# Patient Record
Sex: FEMALE | Race: WHITE | NOT HISPANIC OR LATINO | Employment: FULL TIME | URBAN - METROPOLITAN AREA
[De-identification: names, ages, dates, MRNs, and addresses within clinical notes are randomized per-mention and may not be internally consistent; named-entity substitution may affect disease eponyms.]

---

## 2023-02-17 ENCOUNTER — MEDICAL CORRESPONDENCE (OUTPATIENT)
Dept: HEALTH INFORMATION MANAGEMENT | Facility: CLINIC | Age: 28
End: 2023-02-17

## 2023-02-20 ENCOUNTER — TELEPHONE (OUTPATIENT)
Dept: GASTROENTEROLOGY | Facility: CLINIC | Age: 28
End: 2023-02-20
Payer: COMMERCIAL

## 2023-02-20 ENCOUNTER — PATIENT OUTREACH (OUTPATIENT)
Dept: GASTROENTEROLOGY | Facility: CLINIC | Age: 28
End: 2023-02-20
Payer: COMMERCIAL

## 2023-02-20 ENCOUNTER — TRANSCRIBE ORDERS (OUTPATIENT)
Dept: OTHER | Age: 28
End: 2023-02-20

## 2023-02-20 ENCOUNTER — DOCUMENTATION ONLY (OUTPATIENT)
Dept: GASTROENTEROLOGY | Facility: CLINIC | Age: 28
End: 2023-02-20
Payer: COMMERCIAL

## 2023-02-20 DIAGNOSIS — K83.01 PSC (PRIMARY SCLEROSING CHOLANGITIS) (H): ICD-10-CM

## 2023-02-20 DIAGNOSIS — K83.1 BILIARY OBSTRUCTION (H): ICD-10-CM

## 2023-02-20 DIAGNOSIS — K83.1 BILIARY STRICTURE (H): Primary | ICD-10-CM

## 2023-02-20 NOTE — TELEPHONE ENCOUNTER
"Advanced Endoscopy     Referring provider:  Dr Fabrizio Worthington [facility]      Referred to: [facility]     Provider Requested: NA     Referral Received: 02/20/23     Records received: CareEverywhere     Images received: requested push    Evaluation for:   PSC with dominant biliary stricture with elevated LFTs/pruritus, CBD cannuled, but unable to traverse stricture for dilation & brushings. Please evaluate for additional options       Clinical History (per RN review):       ERCP 2/14/23    Procedure:          - The ERCP duodeno was introduced through the mouth and advanced to the             duodenum and used to locate the major papilla.          -  The ERCP was technically difficult and complex due to challenging             cannulation because of abnormal anatomy.          -  The patient tolerated the procedure well.         ERCP with intraoperative interpretation of cholangiogram.   Findings:          -  The  film was normal.          -  The bile duct was deeply cannulated with the Dreamtome sphincterotome.             Contrast was injected.  The common hepatic duct contained a single severe             stenosis.         ERCP: A side-viewing duodenoscope was advanced to the ampulla without bile             flow. The CBD was selectively cannulated with a sphincterotome preloaded with             an 0.035\" guidewire with guidewire alone on first attempt with sphincterotome             subsequently advanced over the guidewire into the CBD, but an 0.035\" guidewire             could not be advanced into CHD/intrahepatic ducts despite change to 0.025\"             guidewire. A cholangiogram showed 0.5 cm CBD with CHD biliary stricture without             leaks and mild filling of PD during CBD injection with contrast. Despite             multiple additional maneuvers the guidewires could not be advanced beyond CHD             stricture. The duodenoscope was then completely withdrawn from the " "patient and             the procedure terminated. The patient tolerated the procedure well without any             immediate complications and was then moved to the PACU recovery area.   Impression:          -  A single severe biliary stricture was found in the common hepatic duct.         ampulla without bile flow         CBD was selectively cannulated with a sphincterotome preloaded with an 0.035\"         guidewire with guidewire alone, but an 0.035\" guidewire could not be advanced         into CHD/intrahepatic ducts despite change to 0.025\" guidewire         cholangiogram showed 0.5 cm CBD with CHD biliary stricture         despite multiple additional maneuvers the guidewires could not be advanced         beyond CHD stricture     LABS 1/16/23  Alkaline Phosphatase 50 - 136 U/L 143 High      AST 15 - 37 U/L 78 High      ALT 12 - 52 U/L 77 High          MRCP 1/31/23    IMPRESSION:   1.  Irregular stricture of the distal common hepatic duct/proximal common bile duct. The irregularity measures approximately 1.3 cm in length, cholangiocarcinoma is possible. Endoscopic retrograde cholangiopancreatogram recommended.   2.  Irregular, dilated intrahepatic ducts with multifocal strictures compatible with primary sclerosing cholangitis. Mildly enlarged portal lymph nodes, similar.   3.  Dilated central canal of the spinal cord and the conus, this may be a normal variant ventriculus terminalis or a small syrinx, unchanged.      MD review date: 02/20/23    MD Decision for clinic consultation/Orders:   Dr. Fournier:  please schedule ERCP with me for biliary stricture and dilation       Referral updates/Patient contacted: 2/22/23 Scheduled for procedure.    "

## 2023-02-20 NOTE — PROGRESS NOTES
Called [facility] to request that they push images to [place] PACS.    Images Requested:  MRCP 1/31/23   Fluro 2/14/23    Clinic Information:   [facility]  MRI    [address]    [place] [address]    Phone #: [phone] sk

## 2023-02-21 ENCOUNTER — PATIENT OUTREACH (OUTPATIENT)
Dept: GASTROENTEROLOGY | Facility: CLINIC | Age: 28
End: 2023-02-21
Payer: COMMERCIAL

## 2023-02-21 NOTE — PROGRESS NOTES
Following review of referral, per Dr. Fournier: Please schedule ERCP with me for biliary stricture and dilation.    Called patient to discuss symptoms.     Procedure/Imaging/Clinic: ERCP  Physician: Irlanda  Timing: Next available  Procedure length: Provider average  Anesthesia:General  Dx: Biliary stricture and dilation  Tier:2  Location: [facility]    Comments: Offered 3/27/23. Patient is agreeable to schedule. Patient denies pain or nausea. No yellowing of skin or eyes or dark urine. Instructed to contact clinic in the event that these symptoms arise.     Explained they will need a , someone to stay with them for 24 hours and should stay in town for 24 hours (within 45 min of Hospital) post procedure. Patient is from out of town.  or Dad to accompany.     Patient will need a pre-op physical within 30 days of procedure. If outside [facility] system, will need physical faxed to number [phone]   If you do not get a preop physical, your procedure could be cancelled, patient voiced understanding*    Preop Plan: GI appointment upcoming; provided fax number to send visit.     Med Review    Blood thinner -  None  ASA - None  Diabetic - None    COVID monitoring discussed: Yes. Patient to notify clinic in the event of symptoms or exposure within 2 weeks of procedure.     Patient Education r/t procedure: Discussion / MyChart link sent    Does patient have any history of gastric bypass/gastric surgery/altered panc/bili anatomy? No    A pre-op nurse will call 1-2 days prior to the procedure.    NPO/Prep: No solid food 8 hours prior to arrival at the hospital. Clear liquids okay until one hour prior to arrival.     Verbalized understanding of all instructions. All questions answered. Clinic contact and scheduling numbers verified for future questions/concerns.    Cortney Cheung RN, BSN  Care Coordinator  Advanced Endoscopy

## 2023-02-21 NOTE — PROGRESS NOTES
Called [facility] to follow-up on imaging request. Left VM with direct dial.     Images Requested:  MRCP 1/31/23   Fluro 2/14/23     Clinic Information:   [facility]  MRI    [address]    [place] [address]    Phone #: [phone] sk

## 2023-02-22 ENCOUNTER — PREP FOR PROCEDURE (OUTPATIENT)
Dept: GASTROENTEROLOGY | Facility: CLINIC | Age: 28
End: 2023-02-22
Payer: COMMERCIAL

## 2023-02-22 DIAGNOSIS — K83.8 DILATION OF BILIARY TRACT: ICD-10-CM

## 2023-02-22 DIAGNOSIS — K83.1 BILIARY STRICTURE (H): Primary | ICD-10-CM

## 2023-02-22 NOTE — PROGRESS NOTES
Procedure/Imaging/Clinic: ERCP  Physician: Irlanda  Timing: Next available  Procedure length: Provider average  Anesthesia:General  Dx: Biliary stricture and dilation  Tier:2  Location: [facility]

## 2023-03-06 ENCOUNTER — PATIENT OUTREACH (OUTPATIENT)
Dept: GASTROENTEROLOGY | Facility: CLINIC | Age: 28
End: 2023-03-06
Payer: COMMERCIAL

## 2023-03-06 NOTE — PROGRESS NOTES
Returned patient's call regarding a change to her upcoming procedure date with Dr. Fournier. Unable to reach at this time.     Cortney Cheung, RN Care Coordinator

## 2023-03-09 NOTE — PROGRESS NOTES
Rescheduled procedure at patient's request for 4/17/23. Message routed to OR .     Cortney Cheung RN Care Coordinator

## 2023-04-10 ENCOUNTER — PATIENT OUTREACH (OUTPATIENT)
Dept: GASTROENTEROLOGY | Facility: CLINIC | Age: 28
End: 2023-04-10
Payer: COMMERCIAL

## 2023-04-10 NOTE — PROGRESS NOTES
Called patient to discuss requirements for upcoming procedure with Dr. Fournier. Provided her with the number to schedule PAC visit ahead of scheduled ERCP 4/17.     Her liver enzymes have normalized and the patient is asymptomatic. Reviewed note from patient's GI provider noting that he still recommends she have ERCP to evaluate strictures. Patient articulates an understanding.         Dr. NEGRITA Keller Office visit 3/1/23  2. PSC  The patient's was initially evaluated for elevated liver enzymes. Her MRCP 10/2021 showed multiple strictures and intra and extrahepatic biliary tract suggestive of PSC. She did well for a while until January 2023 when she started to have itching. Repeated liver enzymes were elevated. The patient was started on cholestyramine. Subsequent MRCP showed dominant stricture at the distal common hepatic duct proximal common bile duct. The patient was a scheduled for ERCP reports the stricture could not be cannulated. She was referred to a tertiary center for further evaluation and treatment. She is already scheduled to have ERCP at [facility] later this month.    At this point her itching resolved. She stopped using the cholestyramine. I am concerned about possible cholangiocarcinoma and this is stricture. We will order CA 19-9 and I told her to keep her appointment to have the ERCP done. We will recheck her liver enzymes today and if her alkaline phosphatase continues to increase we will plan to start her on UDCA. The patient showed understanding and agreed on the plan    Plan  Monitor for any jaundice or itching  Monitor for fever or chills or altered mental status  Monitor for abdominal pain nausea or vomiting  Do the blood work-up ordered from previous encounter  Keep appointment for ERCP at [facility]  May delay the infliximab infusion for 1 week after the ERCP  Follow-up after her procedure    Cortney Cheung RN Care Coordinator

## 2023-04-10 NOTE — TELEPHONE ENCOUNTER
FUTURE VISIT INFORMATION      SURGERY INFORMATION:    Date: 4/17/23    Location: [facility]    Surgeon:  Guru Marialuisa Fournier MD    Anesthesia Type:  General    Procedure: ENDOSCOPIC RETROGRADE CHOLANGIOPANCREATOGRAPHY    RECORDS REQUESTED FROM:       Pertinent Medical History: None

## 2023-04-13 ENCOUNTER — ANESTHESIA EVENT (OUTPATIENT)
Dept: SURGERY | Facility: CLINIC | Age: 28
End: 2023-04-13
Payer: COMMERCIAL

## 2023-04-13 ENCOUNTER — PRE VISIT (OUTPATIENT)
Dept: SURGERY | Facility: CLINIC | Age: 28
End: 2023-04-13

## 2023-04-13 ENCOUNTER — VIRTUAL VISIT (OUTPATIENT)
Dept: SURGERY | Facility: CLINIC | Age: 28
End: 2023-04-13
Payer: COMMERCIAL

## 2023-04-13 DIAGNOSIS — Z01.818 PREOP EXAMINATION: Primary | ICD-10-CM

## 2023-04-13 PROCEDURE — 99203 OFFICE O/P NEW LOW 30 MIN: CPT | Mod: VID | Performed by: PHYSICIAN ASSISTANT

## 2023-04-13 RX ORDER — ESCITALOPRAM OXALATE 10 MG/1
1 TABLET ORAL EVERY MORNING
COMMUNITY
Start: 2022-06-30

## 2023-04-13 RX ORDER — INFLIXIMAB 100 MG/10ML
INJECTION, POWDER, LYOPHILIZED, FOR SOLUTION INTRAVENOUS
COMMUNITY

## 2023-04-13 RX ORDER — CHOLESTYRAMINE LIGHT 4 G/5.7G
4 POWDER, FOR SUSPENSION ORAL PRN
COMMUNITY
Start: 2023-01-16 | End: 2024-01-11

## 2023-04-13 RX ORDER — ALPRAZOLAM 0.5 MG
0.5 TABLET ORAL PRN
COMMUNITY
Start: 2021-07-07

## 2023-04-13 RX ORDER — AZATHIOPRINE 50 MG/1
3 TABLET ORAL EVERY MORNING
COMMUNITY
Start: 2022-12-19

## 2023-04-13 RX ORDER — LEVONORGESTREL/ETHIN.ESTRADIOL 0.1-0.02MG
1 TABLET ORAL EVERY MORNING
COMMUNITY
Start: 2022-11-16 | End: 2023-11-16

## 2023-04-13 ASSESSMENT — ENCOUNTER SYMPTOMS: SEIZURES: 0

## 2023-04-13 ASSESSMENT — LIFESTYLE VARIABLES: TOBACCO_USE: 0

## 2023-04-13 NOTE — PATIENT INSTRUCTIONS
Preparing for Your Surgery      Name:  Terrie Lehman   MRN:  [id number]   :  1995   Today's Date:  2023       Arriving for surgery:  Surgery date:  23  Arrival time:  7:10 am     Surgeries and procedures: Adult patients can have 2 visitors all through the surgery process.   Visiting hours: 8 a.m. to 8:30 p.m.   Hospital: Adult patients and children under age 18 can have 4 visitor at a time     No visitors under the age of 5 are allowed for hospital patients.  Double occupancy rooms: Patients can have only two visitors at a time.     Patients with disabilities: Can have a support person with them (family member, service provider     Or someone well informed about their needs) plus the allowed number of visitors     Patients confirmed or suspected to have symptoms of COVID 19 or flu:     No visitors allowed for adult patients.   Children (under age 18) can have 1 named visitor.   People who are sick or showing symptoms of COVID 19 or flu:    Are not allowed to visit patients--we can only make exceptions in special situations.     Please follow these guidelines for your visit:   Arrive wearing a mask over your mouth and nose; we will give you a medical mask to wear    If you arrive wearing a cloth mask.   Keep it on during your entire visit, even when in patient's room.   If you don't wear a mask we'll ask you to leave.   Clean your hands with alcohol hand . Do this when you arrive at and leave the building and patient room,    And again after you touch your mask or anything in the room.   You can t visit if you have a fever, cough, shortness of breath, muscle aches, headaches, sore throat    Or diarrhea    Stay 6 feet away from others during your visit and between visits   Go directly to and from the room you are visiting.   Stay in the patient s room during your visit. Limit going to other places in the hospital as much as possible   Leave bags and jackets at home or in the car.   For  everyone s health, please don t come and go during your visit. That includes for smoking   during your visit.     Please come to:     [facility] [address]  [address] [place]  [address]    - ? parking is available in front of the hospital      -   Parking is available in the Patient Visitor Ramp on [address] and [facility].     -   When entering the hospital you will be asked COVID screening questions, you will then be directed to Registration.  Registration will direct you to the 3rd floor Surgery waiting room.     -   Please ask if you need an escort or a wheelchair to the Surgery Waiting Room.  Preop number- [phone]      -    Please proceed to [address] on the 3rd floor. [phone]?     - ?If you are in need of directions, wheelchair or escort please stop at the Information Desk in the lobby.  Inform the information person that you are here for surgery; a wheelchair and escort to [address] will be provided.?     What can I eat or drink?  -  You may eat and drink normally up to 8 hours prior to arrival time. (Until 11:10 pm on 4/16/23 - the night before surgery)  -  You may have clear liquids until 2 hours prior to arrival time. (Until 5:10 am on 4/16/23 - the morning of surgery)    Examples of clear liquids:  Water  Clear broth  Juices (apple, white grape, white cranberry  and cider) without pulp  Noncarbonated, powder based beverages  (lemonade and Ludwig-Aid)  Sodas (Sprite, 7-Up, ginger ale and seltzer)  Coffee or tea (without milk or cream)  Gatorade    -  No Alcohol for at least 24 hours before surgery.     Which medicines can I take?    Hold Aspirin for 7 days before surgery.   Hold Multivitamins for 7 days before surgery.  Hold Supplements for 7 days before surgery.  Hold Ibuprofen (Advil, Motrin) for 1 day(s) before surgery--unless otherwise directed by surgeon.  Hold Naproxen (Aleve) for 4 days before surgery.    -  DO NOT take  these medications the day of surgery:  Super greens - stop now  Collagen-Vitamin C-Biotin - stop now  Cholestyramine (Prevalite)    -  PLEASE TAKE these medications the day of surgery or the night before:  Alprazolam (Xanax) if needed  Azathioprime (Imuran)  Escitalopram (Lexapro)  Aviane  Omeprazole (Prilosec)    How do I prepare myself?  - Please take 2 showers (one the night prior to surgery and one the morning of surgery) using Scrubcare or Hibiclens soap.    Use this soap only from the neck to your toes.     Leave the soap on your skin for one minute--then rinse thoroughly.      You may use your own shampoo and conditioner. No other hair products.   - Please remove all jewelry and body piercings.  - No lotions, deodorants or fragrance.  - No makeup or fingernail polish.   - Bring your ID and insurance card.      ALL PATIENTS GOING HOME THE SAME DAY OF SURGERY ARE REQUIRED TO HAVE A RESPONSIBLE ADULT TO DRIVE AND BE IN ATTENDANCE WITH THEM FOR 24 HOURS FOLLOWING SURGERY.    Covid testing policy as of 12/06/2022  Your surgeon will notify and schedule you for a COVID test if one is needed before surgery--please direct any questions or COVID symptoms to your surgeon      Questions or Concerns:    - For any questions regarding the day of surgery or your hospital stay, please contact the Pre Admission Nursing Office at [phone].     - If you have health changes between today and your surgery, please call your surgeon.     - For questions after surgery, please call your surgeons office.

## 2023-04-13 NOTE — PROGRESS NOTES
Terrie is a 27 year old who is being evaluated via a billable video visit.      How would you like to obtain your AVS? MyChart    Subjective   Terrie is a 27 year old, presenting for the following health issues:  Pre-Op Exam (/)    HPI           Review of Systems       Physical Exam     CLEO Ortiz LPN

## 2023-04-13 NOTE — H&P
Pre-Operative H & P     CC:  Preoperative exam to assess for increased cardiopulmonary risk while undergoing surgery and anesthesia.    Date of Encounter: 4/13/2023  Primary Care Physician:  No Ref-Primary, Physician     Reason for Visit: Biliary stricture; Dilation of biliary tract    HPI  Terrie Lehman is a 27 year old female who presents for pre-operative H & P in preparation for  Procedure Information     Case: [id number] Date/Time: 04/17/23 0910    Procedure: ENDOSCOPIC RETROGRADE CHOLANGIOPANCREATOGRAPHY (Mouth)    Anesthesia type: General    Diagnosis:       Biliary stricture [K83.1]      Dilation of biliary tract [K83.8]    Pre-op diagnosis:       Biliary stricture [K83.1]      Dilation of biliary tract [K83.8]    Location:  OR [address] [place] [address] OR    Providers: Guru Marialuisa Fournier MD          Ms. Lehman has a past medical history of Crohn's disease of both small and large intestine, Dysmenorrhea, and PSC (primary sclerosing cholangitis). A MRCP was done at an outside facility which showed regular stricture in the distal common hepatic and proximal common bile duct concerning for cholangiocarcinoma.  An ERCP was ordered however the strictures could not be cannulated.  The patient was referred to a tertiary center for further evaluation and treatment. She now presents for the above procedure.     PMH is otherwise unremarkable.     History was obtained from patient & chart review.     Hx of abnormal bleeding or anti-platelet use: denies    Menstrual history: Patient's last menstrual period was 03/27/2023.:      Past Medical History  Past Medical History:   Diagnosis Date     Biliary stricture      Crohn's disease (H)      Dysmenorrhea      Primary sclerosing cholangitis        Past Surgical History  Past Surgical History:   Procedure Laterality Date     COLONOSCOPY       ENDOSCOPIC RETROGRADE CHOLANGIOPANCREATOGRAPHY  02/14/2023       Prior to Admission Medications  Current Outpatient Medications    Medication Sig Dispense Refill     ALPRAZolam (XANAX) 0.5 MG tablet Take 0.5 mg by mouth as needed       azaTHIOprine (IMURAN) 50 MG tablet Take 3 tablets by mouth every morning       cholestyramine light (PREVALITE) 4 GM powder Take 4 g by mouth as needed       Collagen-Vitamin C-Biotin (COLLAGEN 1500/C PO) Take by mouth every morning       escitalopram (LEXAPRO) 10 MG tablet Take 1 tablet by mouth every morning       inFLIXimab (REMICADE) 100 MG injection Inject into the vein once every six weeks Last dose Jan 30, 2023       levonorgestrel-ethinyl estradiol (AVIANE) 0.1-20 MG-MCG tablet Take 1 tablet by mouth every morning       Misc Natural Products (SUPER GREENS PO) Take by mouth every morning       omeprazole (PRILOSEC) 20 MG DR capsule Take 1 capsule by mouth every morning         Allergies  No Known Allergies    Social History  Social History     Socioeconomic History     Marital status:      Spouse name: Not on file     Number of children: Not on file     Years of education: Not on file     Highest education level: Not on file   Occupational History     Not on file   Tobacco Use     Smoking status: Never     Passive exposure: Past     Smokeless tobacco: Never   Vaping Use     Vaping status: Not on file   Substance and Sexual Activity     Alcohol use: Yes     Comment: Social 1-2 drinks a month     Drug use: Never     Sexual activity: Not on file   Other Topics Concern     Not on file   Social History Narrative     Not on file     Social Determinants of Health     Financial Resource Strain: Not on file   Food Insecurity: Not on file   Transportation Needs: Not on file   Physical Activity: Not on file   Stress: Not on file   Social Connections: Not on file   Intimate Partner Violence: Not on file   Housing Stability: Not on file       Family History  Family History   Problem Relation Age of Onset     Anesthesia Reaction No family hx of      Deep Vein Thrombosis (DVT) No family hx of        Review of  "Systems  The complete review of systems is negative other than noted in the HPI or here.     Anesthesia Evaluation   Pt has had prior anesthetic.     No history of anesthetic complications       ROS/MED HX  ENT/Pulmonary:  - neg pulmonary ROS  (-) tobacco use, asthma and sleep apnea   Neurologic:  - neg neurologic ROS  (-) no seizures and no CVA   Cardiovascular:       METS/Exercise Tolerance: >4 METS    Hematologic:  - neg hematologic  ROS  (-) history of blood clots and history of blood transfusion   Musculoskeletal:  - neg musculoskeletal ROS     GI/Hepatic: Comment: Crohn's disease of both small and large intestine    Primary sclerosing cholangitis    Dilation of biliary tract    (+) Inflammatory bowel disease,     Renal/Genitourinary:  - neg Renal ROS  (-) renal disease   Endo:     (+) Obesity,  (-) Type II DM   Psychiatric/Substance Use:  - neg psychiatric ROS     Infectious Disease:  - neg infectious disease ROS     Malignancy:  - neg malignancy ROS     Other:  - neg other ROS          Virtual visit -  No vitals were obtained    Physical Exam  Constitutional: Awake, alert, cooperative, no apparent distress, and appears stated age.  HENT: Normocephalic  Respiratory: non labored breathing   Neurologic: Awake, alert, oriented to name, place and time.   Neuropsychiatric: Calm, cooperative. Normal affect.      Prior Labs/Diagnostic Studies   All labs and imaging personally reviewed     FL ENDOSCOPIC RETROGRADE CHOLANGIOPANCREATOGRAM  2/14/23  Impression:          -  A single severe biliary stricture was found in the common hepatic duct.         ampulla without bile flow         CBD was selectively cannulated with a sphincterotome preloaded with an 0.035\"         guidewire with guidewire alone, but an 0.035\" guidewire could not be advanced         into CHD/intrahepatic ducts despite change to 0.025\" guidewire         cholangiogram showed 0.5 cm CBD with CHD biliary stricture         despite multiple additional " maneuvers the guidewires could not be advanced         beyond CHD stricture   Recommendation:          -  Put patient on a clear liquid diet starting in 1 day.         1) avoid NSAIDs         2) monitor for PEP         3) monitor LFTs         4) referral to tertiary center for repeat ERCP with smaller diameter guidewires             or EUS-guided biliary access       The patient's records and results personally reviewed by this provider.     Outside records reviewed from: Care Everywhere (provider notes & imaging @ [facility])      Assessment      Terrie Lehman is a 27 year old female seen as a PAC referral for risk assessment and optimization for anesthesia.    Plan/Recommendations  Pt will be optimized for the proposed procedure.  See below for details on the assessment, risk, and preoperative recommendations    NEUROLOGY  - No history of TIA, CVA or seizure    -Post Op delirium risk factors:  No risk identified    ENT  - No current airway concerns.  Will need to be reassessed day of surgery.  Mallampati: Unable to assess  TM: Unable to assess    CARDIAC  - No history of CAD, Hypertension and Afib  - METS (Metabolic Equivalents)  Patient performs 4 or more METS exercise without symptoms            Total Score: 0      RCRI-Very low risk: Class 1 0.4% complication rate            Total Score: 0        PULMONARY  MEÑO Low Risk            Total Score: 1    MEÑO: BMI over 35 kg/m2      - Denies asthma or inhaler use  - Tobacco History      History   Smoking Status     Never   Smokeless Tobacco     Never       GI  - Denies GERD   - Crohn's disease of both small and large intestine  - Primary sclerosing cholangitis  - Dilation of biliary tract    PONV Medium Risk  Total Score: 2           1 AN PONV: Pt is Female    1 AN PONV: Patient is not a current smoker          ENDOCRINE    - BMI: There is no height or weight on file to calculate BMI. (BMI 35 per notes in Care Everywhere)  Obesity (BMI >30)  - No history of Diabetes  Mellitus    HEME/IMMUNE  VTE Low Risk 0.26%            Total Score: 0      - No history of abnormal bleeding or antiplatelet use.  - Will continue remicade & azathioprine      The patient is aware that the final anesthesia plan will be decided by the assigned anesthesia provider on the date of service.      The patient is optimized for their procedure. AVS with information on surgery time/arrival time, meds and NPO status given by nursing staff. No further diagnostic testing indicated.    Please refer to the physical examination documented by the anesthesiologist in the anesthesia record on the day of surgery.    Video-Visit Details    Type of service:  Video Visit    Provider received verbal consent for a Video Visit from the patient? Yes     Originating Location (pt. Location): Home    Distant Location (provider location):  Off-site  Mode of Communication:  Video Conference via [employer]  On the day of service:     Prep time: 13 minutes  Visit time: 10 minutes  Documentation time: 14 minutes  ------------------------------------------  Total time: 37 minutes      iSlke Cadena PA-C  Preoperative Assessment Center  [facility]  Clinic and Surgery Center  Phone: [phone]  Fax: [phone]

## 2023-04-17 ENCOUNTER — APPOINTMENT (OUTPATIENT)
Dept: GENERAL RADIOLOGY | Facility: CLINIC | Age: 28
End: 2023-04-17
Attending: INTERNAL MEDICINE
Payer: COMMERCIAL

## 2023-04-17 ENCOUNTER — ANESTHESIA (OUTPATIENT)
Dept: SURGERY | Facility: CLINIC | Age: 28
End: 2023-04-17
Payer: COMMERCIAL

## 2023-04-17 ENCOUNTER — HOSPITAL ENCOUNTER (OUTPATIENT)
Facility: CLINIC | Age: 28
Discharge: HOME | End: 2023-04-17
Attending: INTERNAL MEDICINE | Admitting: INTERNAL MEDICINE
Payer: COMMERCIAL

## 2023-04-17 VITALS
RESPIRATION RATE: 16 BRPM | HEIGHT: 68 IN | WEIGHT: 248.9 LBS | OXYGEN SATURATION: 98 % | SYSTOLIC BLOOD PRESSURE: 123 MMHG | DIASTOLIC BLOOD PRESSURE: 90 MMHG | TEMPERATURE: 98 F | BODY MASS INDEX: 37.72 KG/M2 | HEART RATE: 66 BPM

## 2023-04-17 DIAGNOSIS — Z98.890 S/P ERCP: Primary | ICD-10-CM

## 2023-04-17 LAB
ALBUMIN SERPL BCG-MCNC: 3.9 G/DL (ref 3.5–5.2)
ALP SERPL-CCNC: 69 U/L (ref 35–104)
ALT SERPL W P-5'-P-CCNC: 26 U/L (ref 10–35)
AMYLASE SERPL-CCNC: 43 U/L (ref 28–100)
ANION GAP SERPL CALCULATED.3IONS-SCNC: 13 MMOL/L (ref 7–15)
AST SERPL W P-5'-P-CCNC: 27 U/L (ref 10–35)
BILIRUB SERPL-MCNC: 0.2 MG/DL
BUN SERPL-MCNC: 11.5 MG/DL (ref 6–20)
CALCIUM SERPL-MCNC: 8.8 MG/DL (ref 8.6–10)
CHLORIDE SERPL-SCNC: 107 MMOL/L (ref 98–107)
CREAT SERPL-MCNC: 0.77 MG/DL (ref 0.51–0.95)
DEPRECATED HCO3 PLAS-SCNC: 20 MMOL/L (ref 22–29)
ERYTHROCYTE [DISTWIDTH] IN BLOOD BY AUTOMATED COUNT: 13.4 % (ref 10–15)
GFR SERPL CREATININE-BSD FRML MDRD: >90 ML/MIN/1.73M2
GLUCOSE SERPL-MCNC: 92 MG/DL (ref 70–99)
HCG INTACT+B SERPL-ACNC: <1 MIU/ML
HCT VFR BLD AUTO: 42.8 % (ref 35–47)
HGB BLD-MCNC: 14.1 G/DL (ref 11.7–15.7)
INR PPP: 0.97 (ref 0.85–1.15)
LIPASE SERPL-CCNC: 42 U/L (ref 13–60)
MCH RBC QN AUTO: 30.3 PG (ref 26.5–33)
MCHC RBC AUTO-ENTMCNC: 32.9 G/DL (ref 31.5–36.5)
MCV RBC AUTO: 92 FL (ref 78–100)
PLATELET # BLD AUTO: 309 10E3/UL (ref 150–450)
POTASSIUM SERPL-SCNC: 4 MMOL/L (ref 3.4–5.3)
PROT SERPL-MCNC: 7.2 G/DL (ref 6.4–8.3)
RBC # BLD AUTO: 4.65 10E6/UL (ref 3.8–5.2)
SODIUM SERPL-SCNC: 140 MMOL/L (ref 136–145)
WBC # BLD AUTO: 4.3 10E3/UL (ref 4–11)

## 2023-04-17 PROCEDURE — 999N000181 XR SURGERY CARM FLUORO GREATER THAN 5 MIN W STILLS: Mod: TC

## 2023-04-17 PROCEDURE — 36415 COLL VENOUS BLD VENIPUNCTURE: CPT | Performed by: INTERNAL MEDICINE

## 2023-04-17 PROCEDURE — 710N000012 HC RECOVERY PHASE 2, PER MINUTE: Performed by: INTERNAL MEDICINE

## 2023-04-17 PROCEDURE — 255N000002 HC RX 255 OP 636: Performed by: INTERNAL MEDICINE

## 2023-04-17 PROCEDURE — 272N000001 HC OR GENERAL SUPPLY STERILE: Performed by: INTERNAL MEDICINE

## 2023-04-17 PROCEDURE — 710N000009 HC RECOVERY PHASE 1, LEVEL 1, PER MIN: Performed by: INTERNAL MEDICINE

## 2023-04-17 PROCEDURE — 83690 ASSAY OF LIPASE: CPT | Performed by: INTERNAL MEDICINE

## 2023-04-17 PROCEDURE — 999N000141 HC STATISTIC PRE-PROCEDURE NURSING ASSESSMENT: Performed by: INTERNAL MEDICINE

## 2023-04-17 PROCEDURE — C1726 CATH, BAL DIL, NON-VASCULAR: HCPCS | Performed by: INTERNAL MEDICINE

## 2023-04-17 PROCEDURE — 250N000009 HC RX 250: Performed by: INTERNAL MEDICINE

## 2023-04-17 PROCEDURE — 250N000009 HC RX 250: Performed by: NURSE ANESTHETIST, CERTIFIED REGISTERED

## 2023-04-17 PROCEDURE — 250N000011 HC RX IP 250 OP 636: Performed by: NURSE ANESTHETIST, CERTIFIED REGISTERED

## 2023-04-17 PROCEDURE — 250N000025 HC SEVOFLURANE, PER MIN: Performed by: INTERNAL MEDICINE

## 2023-04-17 PROCEDURE — 370N000017 HC ANESTHESIA TECHNICAL FEE, PER MIN: Performed by: INTERNAL MEDICINE

## 2023-04-17 PROCEDURE — 258N000003 HC RX IP 258 OP 636: Performed by: NURSE ANESTHETIST, CERTIFIED REGISTERED

## 2023-04-17 PROCEDURE — 80053 COMPREHEN METABOLIC PANEL: CPT | Performed by: INTERNAL MEDICINE

## 2023-04-17 PROCEDURE — C1877 STENT, NON-COAT/COV W/O DEL: HCPCS | Performed by: INTERNAL MEDICINE

## 2023-04-17 PROCEDURE — 82150 ASSAY OF AMYLASE: CPT | Performed by: INTERNAL MEDICINE

## 2023-04-17 PROCEDURE — 84702 CHORIONIC GONADOTROPIN TEST: CPT | Performed by: ANESTHESIOLOGY

## 2023-04-17 PROCEDURE — 85610 PROTHROMBIN TIME: CPT | Performed by: INTERNAL MEDICINE

## 2023-04-17 PROCEDURE — 85027 COMPLETE CBC AUTOMATED: CPT | Performed by: INTERNAL MEDICINE

## 2023-04-17 PROCEDURE — 360N000083 HC SURGERY LEVEL 3 W/ FLUORO, PER MIN: Performed by: INTERNAL MEDICINE

## 2023-04-17 DEVICE — STENT FREEMAN PANCREA FLEX 4FRX11CM W/O FLANGE SGL PIGTAIL: Type: IMPLANTABLE DEVICE | Site: BILE DUCT | Status: FUNCTIONAL

## 2023-04-17 RX ORDER — LEVOFLOXACIN 500 MG/1
500 TABLET, FILM COATED ORAL DAILY
Qty: 7 TABLET | Refills: 0 | Status: SHIPPED | OUTPATIENT
Start: 2023-04-17 | End: 2023-04-24

## 2023-04-17 RX ORDER — LIDOCAINE HYDROCHLORIDE 20 MG/ML
INJECTION, SOLUTION INFILTRATION; PERINEURAL PRN
Status: DISCONTINUED | OUTPATIENT
Start: 2023-04-17 | End: 2023-04-17

## 2023-04-17 RX ORDER — HYDROMORPHONE HYDROCHLORIDE 1 MG/ML
0.2 INJECTION, SOLUTION INTRAMUSCULAR; INTRAVENOUS; SUBCUTANEOUS EVERY 5 MIN PRN
Status: DISCONTINUED | OUTPATIENT
Start: 2023-04-17 | End: 2023-04-17 | Stop reason: HOSPADM

## 2023-04-17 RX ORDER — KETAMINE HYDROCHLORIDE 10 MG/ML
INJECTION INTRAMUSCULAR; INTRAVENOUS PRN
Status: DISCONTINUED | OUTPATIENT
Start: 2023-04-17 | End: 2023-04-17

## 2023-04-17 RX ORDER — NALOXONE HYDROCHLORIDE 0.4 MG/ML
0.4 INJECTION, SOLUTION INTRAMUSCULAR; INTRAVENOUS; SUBCUTANEOUS
Status: DISCONTINUED | OUTPATIENT
Start: 2023-04-17 | End: 2023-04-17 | Stop reason: HOSPADM

## 2023-04-17 RX ORDER — ONDANSETRON 2 MG/ML
4 INJECTION INTRAMUSCULAR; INTRAVENOUS EVERY 6 HOURS PRN
Status: DISCONTINUED | OUTPATIENT
Start: 2023-04-17 | End: 2023-04-17 | Stop reason: HOSPADM

## 2023-04-17 RX ORDER — LIDOCAINE 40 MG/G
CREAM TOPICAL
Status: DISCONTINUED | OUTPATIENT
Start: 2023-04-17 | End: 2023-04-17 | Stop reason: HOSPADM

## 2023-04-17 RX ORDER — LEVOFLOXACIN 5 MG/ML
INJECTION, SOLUTION INTRAVENOUS PRN
Status: DISCONTINUED | OUTPATIENT
Start: 2023-04-17 | End: 2023-04-17

## 2023-04-17 RX ORDER — DEXAMETHASONE SODIUM PHOSPHATE 4 MG/ML
INJECTION, SOLUTION INTRA-ARTICULAR; INTRALESIONAL; INTRAMUSCULAR; INTRAVENOUS; SOFT TISSUE PRN
Status: DISCONTINUED | OUTPATIENT
Start: 2023-04-17 | End: 2023-04-17

## 2023-04-17 RX ORDER — ONDANSETRON 4 MG/1
4 TABLET, ORALLY DISINTEGRATING ORAL EVERY 30 MIN PRN
Status: DISCONTINUED | OUTPATIENT
Start: 2023-04-17 | End: 2023-04-17 | Stop reason: HOSPADM

## 2023-04-17 RX ORDER — ONDANSETRON 2 MG/ML
4 INJECTION INTRAMUSCULAR; INTRAVENOUS EVERY 30 MIN PRN
Status: DISCONTINUED | OUTPATIENT
Start: 2023-04-17 | End: 2023-04-17 | Stop reason: HOSPADM

## 2023-04-17 RX ORDER — GLYCOPYRROLATE 0.2 MG/ML
INJECTION, SOLUTION INTRAMUSCULAR; INTRAVENOUS PRN
Status: DISCONTINUED | OUTPATIENT
Start: 2023-04-17 | End: 2023-04-17

## 2023-04-17 RX ORDER — FENTANYL CITRATE 50 UG/ML
INJECTION, SOLUTION INTRAMUSCULAR; INTRAVENOUS PRN
Status: DISCONTINUED | OUTPATIENT
Start: 2023-04-17 | End: 2023-04-17

## 2023-04-17 RX ORDER — PROPOFOL 10 MG/ML
INJECTION, EMULSION INTRAVENOUS PRN
Status: DISCONTINUED | OUTPATIENT
Start: 2023-04-17 | End: 2023-04-17

## 2023-04-17 RX ORDER — ONDANSETRON 2 MG/ML
INJECTION INTRAMUSCULAR; INTRAVENOUS PRN
Status: DISCONTINUED | OUTPATIENT
Start: 2023-04-17 | End: 2023-04-17

## 2023-04-17 RX ORDER — INDOMETHACIN 50 MG/1
SUPPOSITORY RECTAL PRN
Status: DISCONTINUED | OUTPATIENT
Start: 2023-04-17 | End: 2023-04-17 | Stop reason: HOSPADM

## 2023-04-17 RX ORDER — NALOXONE HYDROCHLORIDE 0.4 MG/ML
0.2 INJECTION, SOLUTION INTRAMUSCULAR; INTRAVENOUS; SUBCUTANEOUS
Status: DISCONTINUED | OUTPATIENT
Start: 2023-04-17 | End: 2023-04-17 | Stop reason: HOSPADM

## 2023-04-17 RX ORDER — FLUMAZENIL 0.1 MG/ML
0.2 INJECTION, SOLUTION INTRAVENOUS
Status: DISCONTINUED | OUTPATIENT
Start: 2023-04-17 | End: 2023-04-17 | Stop reason: HOSPADM

## 2023-04-17 RX ORDER — SODIUM CHLORIDE, SODIUM LACTATE, POTASSIUM CHLORIDE, CALCIUM CHLORIDE 600; 310; 30; 20 MG/100ML; MG/100ML; MG/100ML; MG/100ML
INJECTION, SOLUTION INTRAVENOUS CONTINUOUS PRN
Status: DISCONTINUED | OUTPATIENT
Start: 2023-04-17 | End: 2023-04-17

## 2023-04-17 RX ORDER — SODIUM CHLORIDE, SODIUM LACTATE, POTASSIUM CHLORIDE, CALCIUM CHLORIDE 600; 310; 30; 20 MG/100ML; MG/100ML; MG/100ML; MG/100ML
INJECTION, SOLUTION INTRAVENOUS CONTINUOUS
Status: DISCONTINUED | OUTPATIENT
Start: 2023-04-17 | End: 2023-04-17 | Stop reason: HOSPADM

## 2023-04-17 RX ORDER — ONDANSETRON 4 MG/1
4 TABLET, ORALLY DISINTEGRATING ORAL EVERY 6 HOURS PRN
Status: DISCONTINUED | OUTPATIENT
Start: 2023-04-17 | End: 2023-04-17 | Stop reason: HOSPADM

## 2023-04-17 RX ORDER — IOPAMIDOL 510 MG/ML
INJECTION, SOLUTION INTRAVASCULAR PRN
Status: DISCONTINUED | OUTPATIENT
Start: 2023-04-17 | End: 2023-04-17 | Stop reason: HOSPADM

## 2023-04-17 RX ORDER — FENTANYL CITRATE 50 UG/ML
25 INJECTION, SOLUTION INTRAMUSCULAR; INTRAVENOUS EVERY 5 MIN PRN
Status: DISCONTINUED | OUTPATIENT
Start: 2023-04-17 | End: 2023-04-17 | Stop reason: HOSPADM

## 2023-04-17 RX ADMIN — SODIUM CHLORIDE, POTASSIUM CHLORIDE, SODIUM LACTATE AND CALCIUM CHLORIDE: 600; 310; 30; 20 INJECTION, SOLUTION INTRAVENOUS at 11:10

## 2023-04-17 RX ADMIN — PROPOFOL 120 MG: 10 INJECTION, EMULSION INTRAVENOUS at 11:20

## 2023-04-17 RX ADMIN — GLYCOPYRROLATE 0.1 MG: 0.2 INJECTION, SOLUTION INTRAMUSCULAR; INTRAVENOUS at 11:26

## 2023-04-17 RX ADMIN — Medication 20 MG: at 11:49

## 2023-04-17 RX ADMIN — FENTANYL CITRATE 100 MCG: 50 INJECTION, SOLUTION INTRAMUSCULAR; INTRAVENOUS at 12:00

## 2023-04-17 RX ADMIN — DEXAMETHASONE SODIUM PHOSPHATE 6 MG: 4 INJECTION, SOLUTION INTRA-ARTICULAR; INTRALESIONAL; INTRAMUSCULAR; INTRAVENOUS; SOFT TISSUE at 11:38

## 2023-04-17 RX ADMIN — MIDAZOLAM 2 MG: 1 INJECTION INTRAMUSCULAR; INTRAVENOUS at 11:10

## 2023-04-17 RX ADMIN — SUGAMMADEX 200 MG: 100 INJECTION, SOLUTION INTRAVENOUS at 12:06

## 2023-04-17 RX ADMIN — Medication 40 MG: at 11:20

## 2023-04-17 RX ADMIN — ONDANSETRON 4 MG: 2 INJECTION INTRAMUSCULAR; INTRAVENOUS at 12:06

## 2023-04-17 RX ADMIN — LEVOFLOXACIN 500 MG: 5 INJECTION, SOLUTION INTRAVENOUS at 11:27

## 2023-04-17 RX ADMIN — Medication 50 MG: at 11:10

## 2023-04-17 RX ADMIN — LIDOCAINE HYDROCHLORIDE 100 MG: 20 INJECTION, SOLUTION INFILTRATION; PERINEURAL at 11:20

## 2023-04-17 ASSESSMENT — ACTIVITIES OF DAILY LIVING (ADL)
ADLS_ACUITY_SCORE: 35

## 2023-04-17 ASSESSMENT — ENCOUNTER SYMPTOMS: SEIZURES: 0

## 2023-04-17 ASSESSMENT — LIFESTYLE VARIABLES: TOBACCO_USE: 0

## 2023-04-17 NOTE — DISCHARGE INSTRUCTIONS
[facility], [place]  Same-Day Surgery   Adult Discharge Orders & Instructions     For 24 hours after surgery    Get plenty of rest.  A responsible adult must stay with you for at least 24 hours after you leave the hospital.   Do not drive or use heavy equipment.  If you have weakness or tingling, don't drive or use heavy equipment until this feeling goes away.  Do not drink alcohol.  Avoid strenuous or risky activities.  Ask for help when climbing stairs.   You may feel lightheaded.  IF so, sit for a few minutes before standing.  Have someone help you get up.   If you have nausea (feel sick to your stomach): Drink only clear liquids such as apple juice, ginger ale, broth or 7-Up.  Rest may also help.  Be sure to drink enough fluids.  Move to a regular diet as you feel able.  You may have a slight fever. Call the doctor if your fever is over 100 F (37.7 C) (taken under the tongue) or lasts longer than 24 hours.  You may have a dry mouth, a sore throat, muscle aches or trouble sleeping.  These should go away after 24 hours.  Do not make important or legal decisions.   Call your doctor for any of the followin.  Signs of infection (fever, growing tenderness at the surgery site, a large amount of drainage or bleeding, severe pain, foul-smelling drainage, redness, swelling).    2. It has been over 8 to 10 hours since surgery and you are still not able to urinate (pass water).    3.  Headache for over 24 hours.      To contact a doctor, call _________Dr. Peterson' office at [phone] _______________________________ or:    '   [phone] and ask for the resident on call for   _________Gastroenterology___________ (answered 24 hours a day)  '   Emergency Department:    [facility]: [phone]       (TTY for hearing impaired: [phone])

## 2023-04-17 NOTE — ANESTHESIA PROCEDURE NOTES
Airway       Patient location during procedure: OR       Procedure Start/Stop Times: 4/17/2023 11:22 AM  Staff -        CRNA: Mark Guzman APRN CRNA       Performed By: CRNAIndications and Patient Condition       Indications for airway management: julio-procedural       Induction type:intravenous       Mask difficulty assessment: 1 - vent by mask    Final Airway Details       Final airway type: endotracheal airway       Successful airway: ETT - single  Endotracheal Airway Details        ETT size (mm): 7.0       Cuffed: yes       Successful intubation technique: direct laryngoscopy       DL Blade Type: MAC 3       Grade View of Cords: 1       Adjucts: stylet       Position: Right       Measured from: lips       Secured at (cm): 23       Bite block used: None    Post intubation assessment        Placement verified by: capnometry and equal breath sounds        Number of attempts at approach: 1       Number of other approaches attempted: 1       Secured with: silk tape       Ease of procedure: easy       Dentition: Intact    Medication(s) Administered   Medication Administration Time: 4/17/2023 11:22 AM

## 2023-04-17 NOTE — BRIEF OP NOTE
[facility]    Brief Operative Note  Terrie Lehman is a 27 year old female with a PMHx of Crohn's disease of both small and large intestine with other complication (HCC), Dysmenorrhea, and PSC (primary sclerosing cholangitis) with MRCP study showing irregular stricture of the distal common hepatic duct/proximal common bile duct concerning for cholangiocarcinoma. She underwent an ERCP on 2/14/2023 at [facility] in which biliary sphincterotomy was performed and common bile duct was accessed, however, wire could not be advanced into the common hepatic duct/intrahepatics. She presents for repeat ERCP for further evaluation    Pre-operative diagnosis: Biliary stricture [K83.1]  Dilation of biliary tract [K83.8]  Post-operative diagnosis Same as pre-operative diagnosis    Procedure: Procedure(s):  ENDOSCOPIC RETROGRADE CHOLANGIOPANCREATOGRAPHY with pancreatic duct stent, balloon dilation of bile duct  Surgeon: Surgeon(s) and Role:     * Guru Marialuisa Fournier MD - Primary     * Ned Dolan MD  Anesthesia: General   Estimated Blood Loss: None    Drains: None  Specimens: * No specimens in log *  Findings:     - The pancreatic duct was accessed and protective 4 Fr x 11 cm Sosa Godwin pancreatic stent was placed into the ventral pancreatic duct   - Cholangiogram showed a moderate stricture involving the biffurcation was seen. This is beleived to be due to patient's known primary sclerosing cholangitis.   - The stricture was dilated with a balloon to 4 mm starting at the biffurcation. The distal common bile duct and the biliary sphincter was also dilated to 4 mm. Stricture appeared to open up after dilation    Complications: None.  Implants:   Implant Name Type Inv. Item Serial No.  Lot No. LRB No. Used Action   STENT GODWIN PANCREA FLEX [id number] W/O FLANGE SGL PIGTAIL - [id number] Stent STENT GODWIN PANCREA FLEX [id number] W/O FLANGE SGL  PIGTAIL  FARIAS [id number] N/A 1 Implanted     Recommendations  - Observe patient in PACU for ongoing care with plans for discharge to home later today   - Confirm spontaneous stent passage by performing a flat and upright abdominal x-ray in 4 weeks. If stent is present, will need repeat EGD (or ERCP) for stent removal  - Will discharge with Levaquin 500 mg PO daily x 7 days   - She can follow up with her GI doctor at [facility]  - The findings and recommendations were discussed with the patient and their family

## 2023-04-17 NOTE — ANESTHESIA PREPROCEDURE EVALUATION
Anesthesia Pre-Procedure Evaluation    Patient: Terrie Lehman   MRN: [id number] : 1995        Procedure : Procedure(s):  ENDOSCOPIC RETROGRADE CHOLANGIOPANCREATOGRAPHY          Past Medical History:   Diagnosis Date     Biliary stricture      Crohn's disease (H)      Dysmenorrhea      Primary sclerosing cholangitis       Past Surgical History:   Procedure Laterality Date     COLONOSCOPY       ENDOSCOPIC RETROGRADE CHOLANGIOPANCREATOGRAPHY  2023      No Known Allergies   Social History     Tobacco Use     Smoking status: Never     Passive exposure: Past     Smokeless tobacco: Never   Vaping Use     Vaping status: Not on file   Substance Use Topics     Alcohol use: Yes     Comment: Social 1-2 drinks a month      Wt Readings from Last 1 Encounters:   23 112.9 kg (248 lb 14.4 oz)        Anesthesia Evaluation   Pt has had prior anesthetic. Type: General.    No history of anesthetic complications       ROS/MED HX  ENT/Pulmonary:  - neg pulmonary ROS  (-) tobacco use, asthma and sleep apnea   Neurologic:  - neg neurologic ROS  (-) no seizures and no CVA   Cardiovascular:       METS/Exercise Tolerance: >4 METS    Hematologic:  - neg hematologic  ROS  (-) history of blood clots and history of blood transfusion   Musculoskeletal:  - neg musculoskeletal ROS     GI/Hepatic: Comment: Crohn's disease of both small and large intestine    Primary sclerosing cholangitis    Dilation of biliary tract    (+) Inflammatory bowel disease, liver disease,     Renal/Genitourinary:  - neg Renal ROS  (-) renal disease   Endo:     (+) Obesity,  (-) Type II DM   Psychiatric/Substance Use:  - neg psychiatric ROS     Infectious Disease:  - neg infectious disease ROS     Malignancy:  - neg malignancy ROS     Other:  - neg other ROS          Physical Exam    Airway        Mallampati: II   TM distance: > 3 FB   Neck ROM: full   Mouth opening: > 3 cm    Respiratory Devices and Support         Dental       (+) Completely normal  teeth      Cardiovascular   cardiovascular exam normal          Pulmonary   pulmonary exam normal                OUTSIDE LABS:  CBC:   Lab Results   Component Value Date    WBC 4.3 04/17/2023    HGB 14.1 04/17/2023    HCT 42.8 04/17/2023     04/17/2023     BMP: No results found for: NA, POTASSIUM, CHLORIDE, CO2, BUN, CR, GLC  COAGS:   Lab Results   Component Value Date    INR 0.97 04/17/2023     POC: No results found for: BGM, HCG, HCGS  HEPATIC: No results found for: ALBUMIN, PROTTOTAL, ALT, AST, GGT, ALKPHOS, BILITOTAL, BILIDIRECT, SHANNAN  OTHER: No results found for: PH, LACT, A1C, ZACHARIAH, PHOS, MAG, LIPASE, AMYLASE, TSH, T4, T3, CRP, SED    Anesthesia Plan    ASA Status:  2      Anesthesia Type: General.     - Airway: ETT   Induction: Intravenous, Propofol.   Maintenance: Balanced.   Techniques and Equipment:     - Airway: Video-Laryngoscope     - Lines/Monitors: 2nd IV     Consents    Anesthesia Plan(s) and associated risks, benefits, and realistic alternatives discussed. Questions answered and patient/representative(s) expressed understanding.     - Discussed: Risks, Benefits and Alternatives for the PROCEDURE were discussed     - Discussed with:  Patient      - Extended Intubation/Ventilatory Support Discussed: No.      - Patient is DNR/DNI Status: No    Use of blood products discussed: No .     Postoperative Care    Pain management: IV analgesics.   PONV prophylaxis: Ondansetron (or other 5HT-3), Dexamethasone or Solumedrol     Comments:                Debby Sainz MD

## 2023-04-17 NOTE — ANESTHESIA POSTPROCEDURE EVALUATION
Patient: Terrie Lehman    Procedure: Procedure(s):  ENDOSCOPIC RETROGRADE CHOLANGIOPANCREATOGRAPHY with pancreatic duct stent, balloon dilation of bile duct       Anesthesia Type:  General    Note:  Disposition: Outpatient   Postop Pain Control: Uneventful            Sign Out: Well controlled pain   PONV: No   Neuro/Psych: Uneventful            Sign Out: Acceptable/Baseline neuro status   Airway/Respiratory: Uneventful            Sign Out: Acceptable/Baseline resp. status   CV/Hemodynamics: Uneventful            Sign Out: Acceptable CV status; No obvious hypovolemia; No obvious fluid overload   Other NRE: NONE   DID A NON-ROUTINE EVENT OCCUR? No           Last vitals:  Vitals Value Taken Time   /95 04/17/23 1247   Temp 36.6  C (97.9  F) 04/17/23 1217   Pulse 73 04/17/23 1253   Resp 4 04/17/23 1253   SpO2 99 % 04/17/23 1253   Vitals shown include unvalidated device data.    Electronically Signed By: Marcelino Daniel MD  April 17, 2023  12:54 PM

## 2023-04-17 NOTE — ANESTHESIA CARE TRANSFER NOTE
Patient: Terrie Lehman    Procedure: Procedure(s):  ENDOSCOPIC RETROGRADE CHOLANGIOPANCREATOGRAPHY with pancreatic duct stent, balloon dilation of bile duct       Diagnosis: Biliary stricture [K83.1]  Dilation of biliary tract [K83.8]  Diagnosis Additional Information: No value filed.    Anesthesia Type:   General     Note:    Oropharynx: oropharynx clear of all foreign objects  Level of Consciousness: awake  Oxygen Supplementation: face mask  Level of Supplemental Oxygen (L/min / FiO2): 6  Independent Airway: airway patency satisfactory and stable  Dentition: dentition unchanged  Vital Signs Stable: post-procedure vital signs reviewed and stable    Patient transferred to: PACU    Handoff Report: Identifed the Patient, Identified the Reponsible Provider, Reviewed the pertinent medical history, Discussed the surgical course, Reviewed Intra-OP anesthesia mangement and issues during anesthesia, Set expectations for post-procedure period and Allowed opportunity for questions and acknowledgement of understanding      Vitals:  Vitals Value Taken Time   /94 04/17/23 1217   Temp     Pulse 71 04/17/23 1220   Resp 14 04/17/23 1220   SpO2 100 % 04/17/23 1220   Vitals shown include unvalidated device data.    Electronically Signed By: YUNIER Walker CRNA  April 17, 2023  12:21 PM

## 2023-04-18 LAB — ERCP: NORMAL

## 2023-04-24 ENCOUNTER — PATIENT OUTREACH (OUTPATIENT)
Dept: GASTROENTEROLOGY | Facility: CLINIC | Age: 28
End: 2023-04-24
Payer: COMMERCIAL

## 2023-04-24 ENCOUNTER — DOCUMENTATION ONLY (OUTPATIENT)
Dept: GASTROENTEROLOGY | Facility: CLINIC | Age: 28
End: 2023-04-24
Payer: COMMERCIAL

## 2023-04-24 DIAGNOSIS — K83.1 BILIARY STRICTURE (H): Primary | ICD-10-CM

## 2023-04-24 NOTE — PROGRESS NOTES
"Follow up: Post ERCP on 4/17/23 with Dr. Fournier.      Post procedure recommendations:   - Observe patient in PACU for ongoing care with plans for discharge to home later today   - Confirm spontaneous stent passage by performing a flat and upright abdominal x-ray in 4 weeks. If stent  is present, will need repeat EGD for stent removal     - Will discharge with Levaquin 500 mg - She can follow up with her GI doctor at [facility]   - The findings and recommendations were discussed with the patient and their family     Patient states: Patient is feeling \"quite a bit better\" today. Describes epigastric pain was tough last week and she did seek care at [facility] ED in [place] over the weekend. Per patient report, labs and CT were negative and she was sent home.     Tylenol has since been helpful. Stooling normally. Appetite okay. Discussed importance of hydration. Soft, bland diet.     Orders placed:   Abdomen XR (due 5/15); added to panc/bili stent log. Patient requests imaging order be sent to  in [place].     Reviewed post procedure recommendations and discussed symptoms to report to our clinic. Patient articulated understanding.     Clinic contact and scheduling numbers verified for future questions/concerns.     Cortney Cheung RN Care Coordinator      "

## 2023-04-24 NOTE — PROGRESS NOTES
Called [facility] in [place] to confirm fax number. Faxed imaging order per Patient's request.     Imaging Ordered by Dr. Carroll 04-24-23:   X-ray Abdomen 2 vw     Images due around 5-15-23    Facility Information:  [facility] Radiology  [address] [place] [address]    Phone #: [phone]  Fax #: [phone] sk

## 2023-04-24 NOTE — TELEPHONE ENCOUNTER
Called pt to follow up on symptoms discussed in mychart communication last week. Left VM.    Rashmi Brown, RN, BSN,   [facility]  Care coordinator

## 2023-04-25 NOTE — PROGRESS NOTES
Called  in [place] to follow-up on receipt of imaging order. Radiologist confirmed receipt but noted that they would need an electronic signature to proceed. Re-sending with electronic signature.     Will follow-up as needed.     Imaging Ordered by Dr. Fournier on 04-24-23:   X-ray Abdomen 2 vw                Images due around 5-15-23     Facility Information:  's Radiology  [address] [place] [address]    Phone #: [phone]  Fax #: [phone]     SK

## 2023-04-26 NOTE — PROGRESS NOTES
Called  in [place] to follow-up on receipt of imaging order with signature. Radiologist confirmed receipt, but denied being able to see provider's name on the order.     Re-sending and will follow-up again.     Imaging Ordered by Dr. Guru Fournier on 04-24-23:   X-ray Abdomen 2 vw                Images due around 5-15-23     Facility Information:  's [facility]  [address] [place] [address] [address]    Phone #: [phone]  Fax #: [phone]     SK

## 2023-04-27 NOTE — PROGRESS NOTES
Called [facility] in [place] to follow-up on receipt of imaging order with signature. Radiologist confirmed receipt and noted that Patient would do a walk-in appointment at her convenience.      Called Patient to relay information. Left VM.     Imaging Ordered by Dr. Guru Fournier on 04-24-23:   X-ray Abdomen 2 vw                Images due around 5-15-23     Facility Information:  [facility]  [address] [place] [address]    Phone #: [phone]  Fax #: [phone]     SK

## 2023-05-21 ENCOUNTER — HEALTH MAINTENANCE LETTER (OUTPATIENT)
Age: 28
End: 2023-05-21

## 2023-05-22 NOTE — PROGRESS NOTES
Called Patient and left VM.   Reminded Patient that images were due last week and that she could walk-in to complete orders.      Imaging Ordered by Dr. Guru Fournier on 04-24-23:   X-ray Abdomen 2 vw                Images due around 5-15-23     Facility Information:  [facility]  [address] [place] [address]    Phone #: [phone]  Fax #: [phone] sk

## 2023-05-26 ENCOUNTER — DOCUMENTATION ONLY (OUTPATIENT)
Dept: GASTROENTEROLOGY | Facility: CLINIC | Age: 28
End: 2023-05-26
Payer: COMMERCIAL

## 2023-05-26 NOTE — PROGRESS NOTES
Called Patient to relay need for imaging appointment as a part of their stent follow-up care. Left VM.     X-rays orders are in place from Dr. Guru Fournier .     Patient is past due for abdominal x-ray (5-15-23) to see if the stent is still present.     Reminded Patient that order has been received by [facility] and that she can do a walk-in to complete images.    SK

## 2024-07-28 ENCOUNTER — HEALTH MAINTENANCE LETTER (OUTPATIENT)
Age: 29
End: 2024-07-28

## 2025-08-10 ENCOUNTER — HEALTH MAINTENANCE LETTER (OUTPATIENT)
Age: 30
End: 2025-08-10

## (undated) DEVICE — SOL WATER IRRIG 1000ML BOTTLE 2F7114

## (undated) DEVICE — ENDO CATH BALLOON DILATION HURRICANE 04MMX4X180CM M00545900

## (undated) DEVICE — SUCTION MANIFOLD NEPTUNE 2 SYS 4 PORT 0702-020-000

## (undated) DEVICE — PACK ENDOSCOPY GI CUSTOM UMMC

## (undated) DEVICE — GLOVE BIOGEL PI ULTRATOUCH G SZ 7.5 42175

## (undated) DEVICE — KIT CONNECTOR FOR OLYMPUS ENDOSCOPES DEFENDO 100310

## (undated) DEVICE — BIOPSY VALVE BIOSHIELD 00711135

## (undated) DEVICE — ENDO BITE BLOCK ADULT OMNI-BLOC

## (undated) DEVICE — ENDO TUBING CO2 SMARTCAP STERILE DISP 100145CO2EXT

## (undated) DEVICE — KIT ENDO FIRST STEP DISINFECTANT 200ML W/POUCH EP-4

## (undated) DEVICE — INFLATION DEVICE BIG 60 ENDO-AN6012

## (undated) RX ORDER — DEXAMETHASONE SODIUM PHOSPHATE 4 MG/ML
INJECTION, SOLUTION INTRA-ARTICULAR; INTRALESIONAL; INTRAMUSCULAR; INTRAVENOUS; SOFT TISSUE
Status: DISPENSED
Start: 2023-04-17

## (undated) RX ORDER — FENTANYL CITRATE 50 UG/ML
INJECTION, SOLUTION INTRAMUSCULAR; INTRAVENOUS
Status: DISPENSED
Start: 2023-04-17

## (undated) RX ORDER — ONDANSETRON 2 MG/ML
INJECTION INTRAMUSCULAR; INTRAVENOUS
Status: DISPENSED
Start: 2023-04-17

## (undated) RX ORDER — GLYCOPYRROLATE 0.2 MG/ML
INJECTION, SOLUTION INTRAMUSCULAR; INTRAVENOUS
Status: DISPENSED
Start: 2023-04-17

## (undated) RX ORDER — LEVOFLOXACIN 5 MG/ML
INJECTION, SOLUTION INTRAVENOUS
Status: DISPENSED
Start: 2023-04-17

## (undated) RX ORDER — PROPOFOL 10 MG/ML
INJECTION, EMULSION INTRAVENOUS
Status: DISPENSED
Start: 2023-04-17